# Patient Record
Sex: FEMALE | Race: WHITE | NOT HISPANIC OR LATINO | Employment: FULL TIME | URBAN - METROPOLITAN AREA
[De-identification: names, ages, dates, MRNs, and addresses within clinical notes are randomized per-mention and may not be internally consistent; named-entity substitution may affect disease eponyms.]

---

## 2023-11-23 ENCOUNTER — HOSPITAL ENCOUNTER (EMERGENCY)
Facility: CLINIC | Age: 38
Discharge: HOME | End: 2023-11-23
Attending: STUDENT IN AN ORGANIZED HEALTH CARE EDUCATION/TRAINING PROGRAM | Admitting: STUDENT IN AN ORGANIZED HEALTH CARE EDUCATION/TRAINING PROGRAM
Payer: COMMERCIAL

## 2023-11-23 VITALS
OXYGEN SATURATION: 99 % | HEART RATE: 92 BPM | RESPIRATION RATE: 18 BRPM | TEMPERATURE: 97.4 F | DIASTOLIC BLOOD PRESSURE: 84 MMHG | SYSTOLIC BLOOD PRESSURE: 149 MMHG

## 2023-11-23 DIAGNOSIS — H10.31 ACUTE CONJUNCTIVITIS OF RIGHT EYE, UNSPECIFIED ACUTE CONJUNCTIVITIS TYPE: ICD-10-CM

## 2023-11-23 PROCEDURE — 99283 EMERGENCY DEPT VISIT LOW MDM: CPT

## 2023-11-23 PROCEDURE — 250N000009 HC RX 250: Performed by: STUDENT IN AN ORGANIZED HEALTH CARE EDUCATION/TRAINING PROGRAM

## 2023-11-23 RX ORDER — TOBRAMYCIN 3 MG/ML
2 SOLUTION/ DROPS OPHTHALMIC ONCE
Status: COMPLETED | OUTPATIENT
Start: 2023-11-23 | End: 2023-11-23

## 2023-11-23 RX ORDER — TOBRAMYCIN 3 MG/ML
1-2 SOLUTION/ DROPS OPHTHALMIC EVERY 4 HOURS
Qty: 5 ML | Refills: 0 | Status: SHIPPED | OUTPATIENT
Start: 2023-11-23 | End: 2023-11-28

## 2023-11-23 RX ADMIN — TOBRAMYCIN 2 DROP: 3 SOLUTION OPHTHALMIC at 19:39

## 2023-11-23 RX ADMIN — FLUORESCEIN SODIUM 2 STRIP: 1 STRIP OPHTHALMIC at 19:39

## 2023-11-23 ASSESSMENT — VISUAL ACUITY
OS: 20/25
OD: 20/50

## 2023-11-23 ASSESSMENT — ACTIVITIES OF DAILY LIVING (ADL): ADLS_ACUITY_SCORE: 35

## 2023-11-24 NOTE — ED PROVIDER NOTES
History     Chief Complaint:  Eye Pain and Eye Problem     HPI   Diana Cabrera is a 38 year old female who presents to the ED for evaluation of right eye pain and redness. The patient reports she developed burning pain, redness, and photophobia to her right eye yesterday which has been worse today with blurred vision. The patient does wear contact lenses and reports she has fallen asleep with her contacts in over the last week. She notes history of eye infections, though not with blurred vision. She denies drainage from the eye.    Independent Historian:   None - Patient Only    Review of External Notes:   None    Medications:    tobramycin (TOBREX) 0.3 % ophthalmic solution      Past Medical History:    Cystitis  Renal Disorder  Pyelononephritis    Past Surgical History:    Tonsillectomy   Concepcion Tooth Extraction    Physical Exam   Patient Vitals for the past 24 hrs:   BP Temp Temp src Pulse Resp SpO2   11/23/23 1728 (!) 149/84 97.4  F (36.3  C) Temporal 92 18 99 %        Physical Exam  General:  Alert, interactive  Eyes: Circular erythema and fluorescein uptake around right iris.  IOP right 16.  IOP left 17. Visual Acuity-Right: 20/50 Visual Acuity-Left: 20/25.  PEERL  Cardiovascular:  Normal rate  Lungs:  No respiratory distress, no accessory muscle use  Abdominal: No distension   Neuro:  Moving all 4 extremities  MSK: No gross deformities  Skin:  Warm, dry    Emergency Department Course   Interventions:  Medications   fluorescein (FUL-HALEY) ophthalmic strip 2 strip (2 strips Both Eyes $Given 11/23/23 1939)   tobramycin (TOBREX) 0.3 % ophthalmic solution 2 drop (2 drops Right Eye $Given 11/23/23 1939)      Assessments:  1828 Initial Examination    Independent Interpretation (X-rays, CTs, rhythm strip):  None    Consultations/Discussion of Management or Tests:  ED Course as of 11/23/23 2213   Thu Nov 23, 2023 1834 Visual Acuity-Right: 20/50 Visual Acuity-Left: 20/25       Social Determinants of Health  affecting care:   None    Disposition:  The patient was discharged to home.     Impression & Plan    CMS Diagnoses: None    Medical Decision Making:  Diana Cabrera is a 38 year old female contact wearer who presents with right eye pain and redness.  There is circular fluorescein uptake suggestive of mild corneal ulcer although she also has conjunctival erythema suggestive of conjunctivitis. Due to her fluoroquinolone allergy, will give tobramycin which she states she has previously tolerated.  Given first dose here in ER and sent prescription to nearby pharmacy.  Asked patient to follow-up urgently with her ophthalmologist, otherwise return to ER in the next few days if visual acuity is worsening, eye pain or redness worsens, she develops headache, new or concerning symptoms.  She understands not to wear her contact lenses and only glasses. All questions answered. All results reviewed. Patient voices understanding and agreement of this plan.      Diagnosis:    ICD-10-CM    1. Acute conjunctivitis of right eye, unspecified acute conjunctivitis type  H10.31            Discharge Medications:  Discharge Medication List as of 11/23/2023  7:53 PM        START taking these medications    Details   tobramycin (TOBREX) 0.3 % ophthalmic solution Place 1-2 drops into the right eye every 4 hours for 5 days, Disp-5 mL, R-0, E-Prescribe            Scribe Disclosure:  I, Charles Ramesh, am serving as a scribe at 6:13 PM on 11/23/2023 to document services personally performed by Mojgan Garcia DO based on my observations and the provider's statements to me.     11/23/2023   Mojgan Garcia DO Pappas Richter, Ellen, DO  11/23/23 2219

## 2023-11-24 NOTE — DISCHARGE INSTRUCTIONS
"Discharge Instructions  Conjunctivitis  Conjunctivitis, or \"pinkeye\", is inflammation of the conjunctiva, which is the thin membrane that lines the inner surface of the eyelids and the whites of the eyes.   There are four main types of conjunctivitis: viral, bacterial, allergic, and non-specific. Both bacterial and viral conjunctivitis spread easily from one person to another by contact with the eye or another person s hands, by an object the infected person has touched (such as a door handle), or by sharing an object that has touched their eye (such as a towel or pillowcase). Because of this, children with bacterial conjunctivitis cannot go back to school or  until they have been on antibiotics for 24 hours.  Generally, every Emergency Department visit should have a follow-up clinic visit with either a primary or a specialty clinic/provider. Please follow-up as instructed by your emergency provider today.  VIRAL CONJUNCTIVITIS: The virus that causes the common cold and is often seen as part of a general cold typically causes this type of conjunctivitis.  This type of conjunctivitis is not treated with antibiotics, and usually lasts 3 - 5 days.  An over-the-counter antihistamine/decongestant eye drop may help to relieve the itching and irritation of viral conjunctivitis.  BACTERIAL CONJUNCTIVITIS:  This is treated with an antibiotic ointment or eye drop.  In both bacterial and viral conjunctivitis, do not wear contact lenses until your eye is no longer red.   Your contact case should be thrown away and the contacts disinfected overnight, or replaced if disposable.  NON-SPECIFIC CONJUNCTIVITIS: Sometimes a red eye is caused by other things such as dry eye, chemical exposure, or foreign body in the eye such as dust or eyelash.   All of these problems generally improve on their own within 24 hours.  ALLERGIC CONJUNCTIVITIS: These are eye symptoms caused by allergies. This type of conjunctivitis will be treated " with allergy medications.    Return to the Emergency Department if:  If you have blurry vision.  If you have increasing eye pain or drainage.  If you have new redness or swelling in the skin around the eye.  If you were given a prescription for medicine here today, be sure to read all of the information (including the package insert) that comes with your prescription.  This will include important information about the medicine, its side effects, and any warnings that you need to know about.  The pharmacist who fills the prescription can provide more information and answer questions you may have about the medicine.  If you have questions or concerns that the pharmacist cannot address, please call or return to the Emergency Department.   Remember that you can always come back to the Emergency Department if you are not able to see your regular provider in the amount of time listed above, if you get any new symptoms, or if there is anything that worries you.